# Patient Record
Sex: MALE | Race: WHITE
[De-identification: names, ages, dates, MRNs, and addresses within clinical notes are randomized per-mention and may not be internally consistent; named-entity substitution may affect disease eponyms.]

---

## 2019-02-27 ENCOUNTER — HOSPITAL ENCOUNTER (OUTPATIENT)
Dept: HOSPITAL 100 - OT | Age: 54
Discharge: HOME | End: 2019-02-27
Payer: COMMERCIAL

## 2019-02-27 DIAGNOSIS — M20.091: ICD-10-CM

## 2019-02-27 DIAGNOSIS — M21.331: Primary | ICD-10-CM

## 2019-02-27 PROCEDURE — 97760 ORTHOTIC MGMT&TRAING 1ST ENC: CPT

## 2019-02-27 PROCEDURE — 97763 ORTHC/PROSTC MGMT SBSQ ENC: CPT

## 2019-02-27 PROCEDURE — 97530 THERAPEUTIC ACTIVITIES: CPT

## 2019-02-27 PROCEDURE — 97110 THERAPEUTIC EXERCISES: CPT

## 2019-02-27 PROCEDURE — 97166 OT EVAL MOD COMPLEX 45 MIN: CPT

## 2023-10-12 ENCOUNTER — PHARMACY VISIT (OUTPATIENT)
Dept: PHARMACY | Facility: CLINIC | Age: 58
End: 2023-10-12
Payer: COMMERCIAL

## 2023-10-12 PROCEDURE — RXMED WILLOW AMBULATORY MEDICATION CHARGE

## 2023-10-12 RX ORDER — BUPRENORPHINE HYDROCHLORIDE 450 UG/1
FILM, SOLUBLE BUCCAL
Qty: 60 FILM | Refills: 0 | OUTPATIENT
Start: 2023-10-11 | End: 2023-11-10 | Stop reason: SDUPTHER

## 2023-10-23 ENCOUNTER — LAB (OUTPATIENT)
Dept: LAB | Facility: LAB | Age: 58
End: 2023-10-23
Payer: COMMERCIAL

## 2023-10-23 ENCOUNTER — HOSPITAL ENCOUNTER (OUTPATIENT)
Dept: RADIOLOGY | Facility: HOSPITAL | Age: 58
Discharge: HOME | End: 2023-10-23
Payer: COMMERCIAL

## 2023-10-23 DIAGNOSIS — Z12.2 ENCOUNTER FOR SCREENING FOR MALIGNANT NEOPLASM OF RESPIRATORY ORGANS: ICD-10-CM

## 2023-10-23 DIAGNOSIS — Z12.5 ENCOUNTER FOR SCREENING FOR MALIGNANT NEOPLASM OF PROSTATE: ICD-10-CM

## 2023-10-23 DIAGNOSIS — Z72.0 TOBACCO USE: ICD-10-CM

## 2023-10-23 DIAGNOSIS — E04.2 NONTOXIC MULTINODULAR GOITER: ICD-10-CM

## 2023-10-23 DIAGNOSIS — Z00.00 ENCOUNTER FOR GENERAL ADULT MEDICAL EXAMINATION WITHOUT ABNORMAL FINDINGS: ICD-10-CM

## 2023-10-23 DIAGNOSIS — Z00.00 ENCOUNTER FOR GENERAL ADULT MEDICAL EXAMINATION WITHOUT ABNORMAL FINDINGS: Primary | ICD-10-CM

## 2023-10-23 DIAGNOSIS — R73.01 IMPAIRED FASTING GLUCOSE: ICD-10-CM

## 2023-10-23 LAB
ALBUMIN SERPL BCP-MCNC: 4.4 G/DL (ref 3.4–5)
ALP SERPL-CCNC: 67 U/L (ref 33–120)
ALT SERPL W P-5'-P-CCNC: 12 U/L (ref 10–52)
ANION GAP SERPL CALC-SCNC: 11 MMOL/L (ref 10–20)
AST SERPL W P-5'-P-CCNC: 12 U/L (ref 9–39)
BASOPHILS # BLD AUTO: 0.07 X10*3/UL (ref 0–0.1)
BASOPHILS NFR BLD AUTO: 1 %
BILIRUB SERPL-MCNC: 0.6 MG/DL (ref 0–1.2)
BUN SERPL-MCNC: 14 MG/DL (ref 6–23)
CALCIUM SERPL-MCNC: 8.9 MG/DL (ref 8.6–10.3)
CHLORIDE SERPL-SCNC: 106 MMOL/L (ref 98–107)
CHOLEST SERPL-MCNC: 150 MG/DL (ref 0–199)
CHOLESTEROL/HDL RATIO: 4.4
CO2 SERPL-SCNC: 27 MMOL/L (ref 21–32)
CREAT SERPL-MCNC: 0.88 MG/DL (ref 0.5–1.3)
EOSINOPHIL # BLD AUTO: 0.23 X10*3/UL (ref 0–0.7)
EOSINOPHIL NFR BLD AUTO: 3.4 %
ERYTHROCYTE [DISTWIDTH] IN BLOOD BY AUTOMATED COUNT: 12.4 % (ref 11.5–14.5)
EST. AVERAGE GLUCOSE BLD GHB EST-MCNC: 108 MG/DL
GFR SERPL CREATININE-BSD FRML MDRD: >90 ML/MIN/1.73M*2
GLUCOSE SERPL-MCNC: 104 MG/DL (ref 74–99)
HBA1C MFR BLD: 5.4 %
HCT VFR BLD AUTO: 46.5 % (ref 41–52)
HDLC SERPL-MCNC: 34 MG/DL
HGB BLD-MCNC: 15.8 G/DL (ref 13.5–17.5)
IMM GRANULOCYTES # BLD AUTO: 0.01 X10*3/UL (ref 0–0.7)
IMM GRANULOCYTES NFR BLD AUTO: 0.1 % (ref 0–0.9)
LDLC SERPL CALC-MCNC: 82 MG/DL
LYMPHOCYTES # BLD AUTO: 1.75 X10*3/UL (ref 1.2–4.8)
LYMPHOCYTES NFR BLD AUTO: 25.8 %
MCH RBC QN AUTO: 33 PG (ref 26–34)
MCHC RBC AUTO-ENTMCNC: 34 G/DL (ref 32–36)
MCV RBC AUTO: 97 FL (ref 80–100)
MONOCYTES # BLD AUTO: 0.66 X10*3/UL (ref 0.1–1)
MONOCYTES NFR BLD AUTO: 9.7 %
NEUTROPHILS # BLD AUTO: 4.07 X10*3/UL (ref 1.2–7.7)
NEUTROPHILS NFR BLD AUTO: 60 %
NON HDL CHOLESTEROL: 116 MG/DL (ref 0–149)
NRBC BLD-RTO: 0 /100 WBCS (ref 0–0)
PLATELET # BLD AUTO: 227 X10*3/UL (ref 150–450)
PMV BLD AUTO: 10.2 FL (ref 7.5–11.5)
POTASSIUM SERPL-SCNC: 4.3 MMOL/L (ref 3.5–5.3)
PROT SERPL-MCNC: 6.2 G/DL (ref 6.4–8.2)
PSA SERPL-MCNC: 0.2 NG/ML
RBC # BLD AUTO: 4.79 X10*6/UL (ref 4.5–5.9)
SODIUM SERPL-SCNC: 140 MMOL/L (ref 136–145)
TRIGL SERPL-MCNC: 170 MG/DL (ref 0–149)
TSH SERPL-ACNC: 1.27 MIU/L (ref 0.44–3.98)
VLDL: 34 MG/DL (ref 0–40)
WBC # BLD AUTO: 6.8 X10*3/UL (ref 4.4–11.3)

## 2023-10-23 PROCEDURE — 84443 ASSAY THYROID STIM HORMONE: CPT

## 2023-10-23 PROCEDURE — 71271 CT THORAX LUNG CANCER SCR C-: CPT

## 2023-10-23 PROCEDURE — 83036 HEMOGLOBIN GLYCOSYLATED A1C: CPT

## 2023-10-23 PROCEDURE — 85025 COMPLETE CBC W/AUTO DIFF WBC: CPT

## 2023-10-23 PROCEDURE — 36415 COLL VENOUS BLD VENIPUNCTURE: CPT

## 2023-10-23 PROCEDURE — 80053 COMPREHEN METABOLIC PANEL: CPT

## 2023-10-23 PROCEDURE — 71250 CT THORAX DX C-: CPT | Performed by: RADIOLOGY

## 2023-10-23 PROCEDURE — 84153 ASSAY OF PSA TOTAL: CPT

## 2023-10-23 PROCEDURE — 80061 LIPID PANEL: CPT

## 2023-11-10 ENCOUNTER — PHARMACY VISIT (OUTPATIENT)
Dept: PHARMACY | Facility: CLINIC | Age: 58
End: 2023-11-10
Payer: COMMERCIAL

## 2023-11-10 PROCEDURE — RXMED WILLOW AMBULATORY MEDICATION CHARGE

## 2023-11-10 RX ORDER — BUPRENORPHINE HYDROCHLORIDE 450 UG/1
FILM, SOLUBLE BUCCAL
Qty: 60 FILM | Refills: 0 | OUTPATIENT
Start: 2023-11-10 | End: 2023-12-11 | Stop reason: SDUPTHER

## 2023-12-11 PROCEDURE — RXMED WILLOW AMBULATORY MEDICATION CHARGE

## 2023-12-12 ENCOUNTER — PHARMACY VISIT (OUTPATIENT)
Dept: PHARMACY | Facility: CLINIC | Age: 58
End: 2023-12-12
Payer: COMMERCIAL

## 2024-01-15 PROCEDURE — RXMED WILLOW AMBULATORY MEDICATION CHARGE

## 2024-01-18 ENCOUNTER — PHARMACY VISIT (OUTPATIENT)
Dept: PHARMACY | Facility: CLINIC | Age: 59
End: 2024-01-18
Payer: COMMERCIAL

## 2024-01-23 ENCOUNTER — HOSPITAL ENCOUNTER (OUTPATIENT)
Dept: HOSPITAL 100 - BFHLAB | Age: 59
Discharge: HOME | End: 2024-01-23
Payer: COMMERCIAL

## 2024-01-23 DIAGNOSIS — N49.2: Primary | ICD-10-CM

## 2024-01-23 PROCEDURE — RXMED WILLOW AMBULATORY MEDICATION CHARGE

## 2024-01-23 PROCEDURE — 87070 CULTURE OTHR SPECIMN AEROBIC: CPT

## 2024-01-23 PROCEDURE — 87077 CULTURE AEROBIC IDENTIFY: CPT

## 2024-01-23 PROCEDURE — 87186 SC STD MICRODIL/AGAR DIL: CPT

## 2024-01-23 PROCEDURE — 87205 SMEAR GRAM STAIN: CPT

## 2024-01-23 PROCEDURE — 87075 CULTR BACTERIA EXCEPT BLOOD: CPT

## 2024-01-24 ENCOUNTER — PHARMACY VISIT (OUTPATIENT)
Dept: PHARMACY | Facility: CLINIC | Age: 59
End: 2024-01-24
Payer: COMMERCIAL

## 2024-01-26 ENCOUNTER — PHARMACY VISIT (OUTPATIENT)
Dept: PHARMACY | Facility: CLINIC | Age: 59
End: 2024-01-26
Payer: COMMERCIAL

## 2024-01-26 PROCEDURE — RXMED WILLOW AMBULATORY MEDICATION CHARGE

## 2024-01-26 RX ORDER — PENICILLIN V POTASSIUM 500 MG/1
TABLET, FILM COATED ORAL
Qty: 30 TABLET | Refills: 0 | OUTPATIENT
Start: 2024-01-26

## 2024-01-29 ENCOUNTER — PHARMACY VISIT (OUTPATIENT)
Dept: PHARMACY | Facility: CLINIC | Age: 59
End: 2024-01-29
Payer: COMMERCIAL

## 2024-01-29 PROCEDURE — RXMED WILLOW AMBULATORY MEDICATION CHARGE

## 2024-01-29 RX ORDER — CEFDINIR 300 MG/1
CAPSULE ORAL
Qty: 14 CAPSULE | Refills: 0 | OUTPATIENT
Start: 2024-01-29

## 2024-02-16 PROCEDURE — RXMED WILLOW AMBULATORY MEDICATION CHARGE

## 2024-02-17 ENCOUNTER — PHARMACY VISIT (OUTPATIENT)
Dept: PHARMACY | Facility: CLINIC | Age: 59
End: 2024-02-17
Payer: COMMERCIAL

## 2024-03-18 PROCEDURE — RXMED WILLOW AMBULATORY MEDICATION CHARGE

## 2024-03-20 ENCOUNTER — PHARMACY VISIT (OUTPATIENT)
Dept: PHARMACY | Facility: CLINIC | Age: 59
End: 2024-03-20
Payer: COMMERCIAL

## 2024-04-19 PROCEDURE — RXMED WILLOW AMBULATORY MEDICATION CHARGE

## 2024-04-22 ENCOUNTER — PHARMACY VISIT (OUTPATIENT)
Dept: PHARMACY | Facility: CLINIC | Age: 59
End: 2024-04-22
Payer: COMMERCIAL

## 2024-05-08 PROCEDURE — RXMED WILLOW AMBULATORY MEDICATION CHARGE

## 2024-05-09 ENCOUNTER — PHARMACY VISIT (OUTPATIENT)
Dept: PHARMACY | Facility: CLINIC | Age: 59
End: 2024-05-09
Payer: COMMERCIAL

## 2024-05-21 PROCEDURE — RXMED WILLOW AMBULATORY MEDICATION CHARGE

## 2024-05-22 ENCOUNTER — PHARMACY VISIT (OUTPATIENT)
Dept: PHARMACY | Facility: CLINIC | Age: 59
End: 2024-05-22
Payer: COMMERCIAL

## 2024-06-24 ENCOUNTER — HOSPITAL ENCOUNTER (OUTPATIENT)
Dept: RADIOLOGY | Facility: HOSPITAL | Age: 59
Discharge: HOME | End: 2024-06-24
Payer: COMMERCIAL

## 2024-06-24 DIAGNOSIS — R14.1 GAS PAIN: ICD-10-CM

## 2024-06-24 PROCEDURE — 74018 RADEX ABDOMEN 1 VIEW: CPT

## 2024-06-24 PROCEDURE — 74018 RADEX ABDOMEN 1 VIEW: CPT | Performed by: RADIOLOGY

## 2024-12-23 ENCOUNTER — HOSPITAL ENCOUNTER (OUTPATIENT)
Age: 59
Discharge: HOME | End: 2024-12-23
Payer: COMMERCIAL

## 2024-12-23 DIAGNOSIS — Z00.00: Primary | ICD-10-CM

## 2024-12-23 DIAGNOSIS — Z12.5: ICD-10-CM

## 2024-12-23 DIAGNOSIS — Z79.899: ICD-10-CM

## 2024-12-23 DIAGNOSIS — R73.01: ICD-10-CM

## 2024-12-23 LAB
ALANINE AMINOTRANSFER ALT/SGPT: 19 U/L (ref 16–61)
ALBUMIN SERPL-MCNC: 3.7 G/DL (ref 3.2–5)
ALKALINE PHOSPHATASE: 75 U/L (ref 45–117)
AMPHET UR-MCNC: NEGATIVE NG/ML
ANION GAP: 3 (ref 5–15)
AST(SGOT): 11 U/L (ref 15–37)
BARBITURATE URINE VISTA: NEGATIVE
BENZODIAZEPINE URINE VISTA: NEGATIVE
BUN SERPL-MCNC: 14 MG/DL (ref 7–18)
BUN/CREAT RATIO: 13.9 RATIO (ref 10–20)
CALCIUM SERPL-MCNC: 8.8 MG/DL (ref 8.5–10.1)
CARBON DIOXIDE: 29 MMOL/L (ref 21–32)
CHLORIDE: 106 MMOL/L (ref 98–107)
CHOLEST SERPL-MCNC: 149 MG/DL
COCAINE URINE VISTA: NEGATIVE
DEPRECATED RDW RBC: 46.5 FL (ref 35.1–43.9)
DRUG CONFIRMATION TO FOLLOW?: (no result)
ECSTACY URINE VISTA: NEGATIVE
ERYTHROCYTE [DISTWIDTH] IN BLOOD: 12.9 % (ref 11.6–14.6)
EST GLOM FILT RATE - AFR AMER: 98 ML/MIN (ref 60–?)
GLOBULIN: 3 G/DL (ref 2.2–4.2)
GLUCOSE: 93 MG/DL (ref 74–106)
HCT VFR BLD AUTO: 46.2 % (ref 40–54)
HEMOGLOBIN: 15.6 G/DL (ref 13–16.5)
HGB BLD-MCNC: 15.6 G/DL (ref 13–16.5)
IMMATURE GRANULOCYTES COUNT: 0.03 X10^3/UL (ref 0–0)
MCV RBC: 97.5 FL (ref 80–94)
MEAN CORP HGB CONC: 33.8 G/DL (ref 32–36)
MEAN PLATELET VOL.: 10.3 FL (ref 6.2–12)
METHADONE URINE VISTA: NEGATIVE
NRBC FLAGGED BY ANALYZER: 0 % (ref 0–5)
PCP UR QL: NEGATIVE
PH UR: 5 [PH]
PLATELET # BLD: 228 K/MM3 (ref 150–450)
PLATELET COUNT: 228 K/MM3 (ref 150–450)
POTASSIUM: 4.4 MMOL/L (ref 3.5–5.1)
PSA,TOTAL - ANNUAL SCREEN: 0.2 NG/ML (ref 0–4)
RBC # BLD AUTO: 4.74 M/MM3 (ref 4.6–6.2)
RBC DISTRIBUTION WIDTH CV: 12.9 % (ref 11.6–14.6)
RBC DISTRIBUTION WIDTH SD: 46.5 FL (ref 35.1–43.9)
SP GR UR: 1.01 (ref 1–1.03)
THC URINE VISTA: NEGATIVE
TRIGLYCERIDES: 91 MG/DL
URINE PRESERVATIVE: (no result)
VLDLC SERPL-MCNC: 18 MG/DL (ref 5–40)
WBC # BLD AUTO: 7 K/MM3 (ref 4.4–11)
WHITE BLOOD COUNT: 7 K/MM3 (ref 4.4–11)

## 2024-12-23 PROCEDURE — 81002 URINALYSIS NONAUTO W/O SCOPE: CPT

## 2024-12-23 PROCEDURE — 85025 COMPLETE CBC W/AUTO DIFF WBC: CPT

## 2024-12-23 PROCEDURE — 80307 DRUG TEST PRSMV CHEM ANLYZR: CPT

## 2024-12-23 PROCEDURE — 83036 HEMOGLOBIN GLYCOSYLATED A1C: CPT

## 2024-12-23 PROCEDURE — 80061 LIPID PANEL: CPT

## 2024-12-23 PROCEDURE — 80053 COMPREHEN METABOLIC PANEL: CPT

## 2024-12-23 PROCEDURE — 84153 ASSAY OF PSA TOTAL: CPT

## 2024-12-23 PROCEDURE — G0103 PSA SCREENING: HCPCS

## 2024-12-23 PROCEDURE — 36415 COLL VENOUS BLD VENIPUNCTURE: CPT

## 2025-01-20 ENCOUNTER — HOSPITAL ENCOUNTER (OUTPATIENT)
Dept: RADIOLOGY | Facility: HOSPITAL | Age: 60
Discharge: HOME | End: 2025-01-20
Payer: COMMERCIAL

## 2025-01-20 DIAGNOSIS — Z72.0 TOBACCO USE: ICD-10-CM

## 2025-01-20 DIAGNOSIS — Z12.2 ENCOUNTER FOR SCREENING FOR MALIGNANT NEOPLASM OF RESPIRATORY ORGANS: ICD-10-CM

## 2025-01-20 PROCEDURE — 71271 CT THORAX LUNG CANCER SCR C-: CPT | Performed by: RADIOLOGY

## 2025-01-20 PROCEDURE — 71271 CT THORAX LUNG CANCER SCR C-: CPT

## 2025-03-22 ENCOUNTER — PHARMACY VISIT (OUTPATIENT)
Dept: PHARMACY | Facility: CLINIC | Age: 60
End: 2025-03-22
Payer: COMMERCIAL

## 2025-03-22 PROCEDURE — RXMED WILLOW AMBULATORY MEDICATION CHARGE

## 2025-04-17 ENCOUNTER — PHARMACY VISIT (OUTPATIENT)
Dept: PHARMACY | Facility: CLINIC | Age: 60
End: 2025-04-17
Payer: COMMERCIAL

## 2025-04-17 PROCEDURE — RXMED WILLOW AMBULATORY MEDICATION CHARGE

## 2025-04-17 RX ORDER — BUPRENORPHINE HYDROCHLORIDE 450 UG/1
450 FILM, SOLUBLE BUCCAL EVERY 12 HOURS
Qty: 60 FILM | Refills: 0 | OUTPATIENT
Start: 2025-04-17

## 2025-05-17 PROCEDURE — RXMED WILLOW AMBULATORY MEDICATION CHARGE

## 2025-05-18 ENCOUNTER — PHARMACY VISIT (OUTPATIENT)
Dept: PHARMACY | Facility: CLINIC | Age: 60
End: 2025-05-18
Payer: COMMERCIAL

## 2025-06-13 ENCOUNTER — PHARMACY VISIT (OUTPATIENT)
Dept: PHARMACY | Facility: CLINIC | Age: 60
End: 2025-06-13
Payer: COMMERCIAL

## 2025-06-13 ENCOUNTER — EVALUATION (OUTPATIENT)
Dept: PHYSICAL THERAPY | Facility: CLINIC | Age: 60
End: 2025-06-13
Payer: COMMERCIAL

## 2025-06-13 DIAGNOSIS — M51.362 OTHER INTERVERTEBRAL DISC DEGENERATION, LUMBAR REGION WITH DISCOGENIC BACK PAIN AND LOWER EXTREMITY PAIN: ICD-10-CM

## 2025-06-13 DIAGNOSIS — M43.16 SPONDYLOLISTHESIS, LUMBAR REGION: ICD-10-CM

## 2025-06-13 PROCEDURE — RXMED WILLOW AMBULATORY MEDICATION CHARGE

## 2025-06-13 PROCEDURE — 97161 PT EVAL LOW COMPLEX 20 MIN: CPT | Mod: GP

## 2025-06-13 RX ORDER — APIXABAN 5 MG/1
5 TABLET, FILM COATED ORAL 2 TIMES DAILY
Qty: 180 TABLET | Refills: 1 | OUTPATIENT
Start: 2025-06-13

## 2025-06-13 RX ORDER — BUPRENORPHINE HYDROCHLORIDE 450 UG/1
450 FILM, SOLUBLE BUCCAL EVERY 12 HOURS
Qty: 60 FILM | Refills: 0 | OUTPATIENT
Start: 2025-06-13

## 2025-06-13 RX ORDER — SIMVASTATIN 40 MG/1
40 TABLET, FILM COATED ORAL DAILY
Qty: 90 TABLET | Refills: 3 | OUTPATIENT
Start: 2025-06-13

## 2025-06-13 RX ORDER — PREGABALIN 100 MG/1
100 CAPSULE ORAL 3 TIMES DAILY
Qty: 270 CAPSULE | Refills: 1 | OUTPATIENT
Start: 2025-06-13

## 2025-06-13 RX ORDER — MONTELUKAST SODIUM 10 MG/1
10 TABLET ORAL DAILY
Qty: 90 TABLET | Refills: 3 | OUTPATIENT
Start: 2025-06-13

## 2025-06-13 RX ORDER — NYSTATIN 100000 [USP'U]/G
1 POWDER TOPICAL 2 TIMES DAILY
Qty: 60 G | Refills: 0 | OUTPATIENT
Start: 2025-06-13

## 2025-06-13 RX ORDER — MELOXICAM 15 MG/1
15 TABLET ORAL DAILY
Qty: 90 TABLET | Refills: 3 | OUTPATIENT
Start: 2025-06-13

## 2025-06-13 RX ORDER — OMEPRAZOLE 40 MG/1
40 CAPSULE, DELAYED RELEASE ORAL DAILY
Qty: 90 CAPSULE | Refills: 3 | OUTPATIENT
Start: 2025-06-13

## 2025-06-13 RX ORDER — BACLOFEN 10 MG/1
TABLET ORAL
Qty: 270 TABLET | Refills: 3 | OUTPATIENT
Start: 2025-06-13

## 2025-06-13 RX ORDER — METOPROLOL SUCCINATE 50 MG/1
50 TABLET, EXTENDED RELEASE ORAL DAILY
Qty: 90 TABLET | Refills: 3 | OUTPATIENT
Start: 2025-06-13

## 2025-06-13 RX ORDER — CITALOPRAM 40 MG/1
40 TABLET ORAL DAILY
Qty: 90 TABLET | Refills: 1 | OUTPATIENT
Start: 2025-06-13

## 2025-06-13 ASSESSMENT — PATIENT HEALTH QUESTIONNAIRE - PHQ9
SUM OF ALL RESPONSES TO PHQ9 QUESTIONS 1 AND 2: 0
2. FEELING DOWN, DEPRESSED OR HOPELESS: NOT AT ALL
1. LITTLE INTEREST OR PLEASURE IN DOING THINGS: NOT AT ALL

## 2025-06-13 ASSESSMENT — PAIN SCALES - GENERAL: PAINLEVEL_OUTOF10: 10 - WORST POSSIBLE PAIN

## 2025-06-13 ASSESSMENT — ENCOUNTER SYMPTOMS
OCCASIONAL FEELINGS OF UNSTEADINESS: 1
LOSS OF SENSATION IN FEET: 1
DEPRESSION: 0

## 2025-06-13 ASSESSMENT — PAIN - FUNCTIONAL ASSESSMENT: PAIN_FUNCTIONAL_ASSESSMENT: 0-10

## 2025-06-13 ASSESSMENT — ACTIVITIES OF DAILY LIVING (ADL): EFFECT OF PAIN ON DAILY ACTIVITIES: SEE GOALS

## 2025-06-13 NOTE — PROGRESS NOTES
Physical Therapy Evaluation and Treatment      Patient Name: Abhinav Little  MRN: [id number]  Today's Date: 2025  : 1965  Edith Sebastian  Time Calculation  Start Time: 1510  Stop Time: 1545  Time Calculation (min): 35 min    PT Evaluation Time Entry  PT Evaluation (Low) Time Entry: 35                             Assessment:  Rehab Prognosis: Fair (chronic sx; previous rehab)  Barriers to Participation:  (none)    Plan:  Treatment/Interventions: Aquatic therapy, Cryotherapy, Dry needling, Education/ Instruction, Electrical stimulation, Gait training, Hot pack, Manual therapy, Mechanical traction, Self care/ home management, Therapeutic exercises, Other (comment) (IASTM/cupping)  PT Plan: Skilled PT  PT Frequency: 1 time per week  Duration: 6wks  Onset Date: 85  Rehab Potential: Fair (chronic syndrome; previous rehab)  Plan of Care Agreement: Patient    Current Problem:   1. Spondylolisthesis, lumbar region  Referral to Physical Therapy    Follow Up In Physical Therapy      2. Other intervertebral disc degeneration, lumbar region with discogenic back pain and lower extremity pain  Referral to Physical Therapy    Follow Up In Physical Therapy          Subjective    General:  General  Reason for Referral: lumbar discogenic/BLE pain  Referred By: Jaz MARY  Past Medical History Relevant to Rehab: spondylolisthesis  General Comment:   C/C sx*/Max sx level*:10/10 LB nad BLE  GRETA/DOI/Work*?:  decades long sx; insidious  ADL makes worse*?:stdg  ADL makes better?: sitting from stdg  PLOF:Unlimited job/home tasks performed-NO: std/waling   (see goals)  Testing*:confirmative MRI 3yrs ago    Physical Findings*: Limited: AROM ( trunk )                                                Strength ( abdominals )                                            Confirmed spondylolisthesis with BLE transient sx enough to cause the patient to fall (pending surgery)  POC:  Ongoing clinic PT to include: continue with  "flexion bias core work and trunk ROM; STW PRN    Other: (copay*?- no ) (fall risk*?- mod  ) (ins visit limit*?-  60 )     Precautions:  Precautions  STEADI Fall Risk Score (The score of 4 or more indicates an increased risk of falling): 6  Precautions Comment: low fall risk; neck surgery    Pain:  Pain Assessment  Pain Assessment: 0-10  0-10 (Numeric) Pain Score: 10 - Worst possible pain (max sx)  Pain Location: Back  Pain Orientation: Lower  Pain Radiating Towards: BLE transient sharp sx  Pain Onset:  (sx for decades; insidious onset)  Effect of Pain on Daily Activities: see goals  Pain Interventions:  (chirop; injections; PT; ablations)  Response to Interventions:  (ablations helped)    Prior Level of Function:  Prior Function Per Pt/Caregiver Report  Vocational: Full time employment ()    Objective     Outcome Measures:  Other Measures  Oswestry Disablity Index (ROSELYN): 23     Treatments:eval only-time  Confirmed spondylolisthesis with BLE transient sx enough to cause the patient to fall (pending surgery)  POC:  Ongoing clinic PT to include: continue with flexion bias core work and trunk ROM; STW PRN        Goals:  Active       PT Problem       PT Goal 1       Start:  06/13/25    Expected End:  09/11/25       1. Independent HEP to allow for 50% reduction in max ADL C/C sx ( 10/10) 2-3wks  2. Survey score improvement from 46% to 35% (ROSELYN) 3-4wks  3. ROM increase to allow for improved ADL dressing lowers) 3-4wks (from 50% to 75% SSBs) 3-4wks  4. Strength increase to allow for improved ADL stdg (from 4-/5 to 4+/5 abdominals) 3-4wks         PT Goal 2       Start:  06/13/25    Expected End:  09/11/25       1. \"I want my  back  to feel better\".              Lumbar Spine      Lumbar Palpation/Joint Mobility Assessment  Palpation/Joint Mobility Comment: palpable lumbar step-off  Lumbar AROM  Lumbar flexion: (60°): 85%  Lumbar extension (25°): 40%  Lumbar sidebend right (25°): 50%  Lumbar sidebend left " (25°): 50%    Lumbar Myotomes  Lumbar Myotomes WFL: yes

## 2025-06-19 ENCOUNTER — TREATMENT (OUTPATIENT)
Dept: PHYSICAL THERAPY | Facility: CLINIC | Age: 60
End: 2025-06-19
Payer: COMMERCIAL

## 2025-06-19 DIAGNOSIS — M51.362 OTHER INTERVERTEBRAL DISC DEGENERATION, LUMBAR REGION WITH DISCOGENIC BACK PAIN AND LOWER EXTREMITY PAIN: ICD-10-CM

## 2025-06-19 DIAGNOSIS — M43.16 SPONDYLOLISTHESIS, LUMBAR REGION: ICD-10-CM

## 2025-06-19 PROCEDURE — 97113 AQUATIC THERAPY/EXERCISES: CPT | Mod: GP,CQ

## 2025-06-19 ASSESSMENT — PAIN SCALES - GENERAL: PAINLEVEL_OUTOF10: 4

## 2025-06-19 ASSESSMENT — PAIN - FUNCTIONAL ASSESSMENT: PAIN_FUNCTIONAL_ASSESSMENT: 0-10

## 2025-06-19 NOTE — PROGRESS NOTES
Physical Therapy Treatment    Patient Name: Abhinav Little  MRN: [id number]  Today's Date: 6/19/2025  Time Calculation  Start Time: 1045  Stop Time: 1130  Time Calculation (min): 45 min    PT Therapeutic Procedures Time Entry  Aquatic Therapy Time Entry: 43         Subjective  Patient reports compliance with HEP and expressed good understanding. Patient reports that his pain levels are 4/10 currently, states that his pain was much higher in the morning and at night.       Objective: Trace contraction with palpation    Assessment: Patient identified by name and date of birth. Oriented patient to pool. Trace contraction noted with TrA contraction during palpation. Patient demonstrates some trunk sway with UE/LE activities, moderate cuing to keep TrA engaged. Abolished pain with unloading.     PT Assessment  Rehab Prognosis: Fair (chronic sx; previous rehab)    Plan:   OP PT Plan  Treatment/Interventions: Aquatic therapy, Cryotherapy, Dry needling, Education/ Instruction, Electrical stimulation, Gait training, Hot pack, Manual therapy, Mechanical traction, Self care/ home management, Therapeutic exercises, Other (comment) (IASTM/cupping)  PT Plan: Skilled PT  PT Frequency: 1 time per week  Duration: 6wks  Onset Date: 06/13/85  Rehab Potential: Fair (chronic syndrome; previous rehab)  Plan of Care Agreement: Patient    Plan to continue with core strengthening and stabilization to allow for improved ability to stand for prolonged periods.    POC:  Ongoing clinic PT to include: continue with flexion bias core work and trunk ROM; STW PRN        Precautions  Precautions  Precautions Comment: low fall risk; neck surgery  Pain  Pain Assessment: 0-10  0-10 (Numeric) Pain Score: 4  Pain Location: Back  Pain Orientation: Lower  Pain Radiating Towards: No LE Sx currently          Current Problem  1. Spondylolisthesis, lumbar region  Follow Up In Physical Therapy      2. Other intervertebral disc degeneration, lumbar region  "with discogenic back pain and lower extremity pain  Follow Up In Physical Therapy          Reason for Referral: lumbar discogenic/BLE pain  Referred By: Jaz MARY  Past Medical History Relevant to Rehab: spondylolisthesis  General Comment: 2/9      Treatments:   All exercises performed in 65-70% WBing unless noted  TrA contraction review 10 x 5” holds  Side Stepping 3 laps  Hip Flexion x10  Hip Abduction x10  Alt Marching x10  Heel Raises x10  Squats A  Noodle rotation A  UE paddles Open x20  -flex/ext, push/pull, habd/hadd, abd/add  Dumbbell Rolls A  Paddle Board-push/pull, flex/ext  A  100% unloading 1 lrg noodle, 2 UE support at pool ladder  -bicycle 5'  -hip abd 5'  -hang 5'  Gradual exit 3'    OP EDUCATION:  Outpatient Education  Individual(s) Educated: Patient  Patient/Caregiver Demonstrated Understanding: yes  Plan of Care Discussed and Agreed Upon: yes    Goals:  Active       PT Problem       PT Goal 1       Start:  06/13/25    Expected End:  09/11/25       1. Independent HEP to allow for 50% reduction in max ADL C/C sx ( 10/10) 2-3wks  2. Survey score improvement from 46% to 35% (ROSELYN) 3-4wks  3. ROM increase to allow for improved ADL dressing lowers) 3-4wks (from 50% to 75% SSBs) 3-4wks  4. Strength increase to allow for improved ADL stdg (from 4-/5 to 4+/5 abdominals) 3-4wks         PT Goal 2       Start:  06/13/25    Expected End:  09/11/25       1. \"I want my  back  to feel better\".             "

## 2025-06-27 ENCOUNTER — TREATMENT (OUTPATIENT)
Dept: PHYSICAL THERAPY | Facility: CLINIC | Age: 60
End: 2025-06-27
Payer: COMMERCIAL

## 2025-06-27 DIAGNOSIS — M43.16 SPONDYLOLISTHESIS, LUMBAR REGION: ICD-10-CM

## 2025-06-27 DIAGNOSIS — M51.362 OTHER INTERVERTEBRAL DISC DEGENERATION, LUMBAR REGION WITH DISCOGENIC BACK PAIN AND LOWER EXTREMITY PAIN: ICD-10-CM

## 2025-06-27 PROCEDURE — 97113 AQUATIC THERAPY/EXERCISES: CPT | Mod: GP,CQ | Performed by: PHYSICAL THERAPY ASSISTANT

## 2025-06-27 ASSESSMENT — PAIN - FUNCTIONAL ASSESSMENT: PAIN_FUNCTIONAL_ASSESSMENT: 0-10

## 2025-06-27 ASSESSMENT — PAIN SCALES - GENERAL: PAINLEVEL_OUTOF10: 7

## 2025-06-27 NOTE — PROGRESS NOTES
Physical Therapy    Physical Therapy Treatment    Patient Name: Abhinav Little  MRN: [id number]  Today's Date: 6/27/2025  Time Calculation  Start Time: 1000  Stop Time: 1045  Time Calculation (min): 45 min  PT Therapeutic Procedures Time Entry  Aquatic Therapy Time Entry: 20                   Current Problem  Problem List Items Addressed This Visit           ICD-10-CM       Musculoskeletal and Injuries    Spondylolisthesis, lumbar region M43.16       Neuro    Other intervertebral disc degeneration, lumbar region with discogenic back pain and lower extremity pain M51.362        General  Reason for Referral: lumbar discogenic/BLE pain  Referred By: Jaz MARY  Past Medical History Relevant to Rehab: spondylolisthesis  General Comment: 3/9    Subjective  Patient reports no falls and states low back pain of 7/10 today.  Patient states has worked 3 weeks in a row with no time off.  Reports short handed at work. PATIENT HAD A 20 MINUTE LESSON TODAY DUE TO SEVERE STORM/THUNDERSTORMS.    Precautions  Precautions  Precautions Comment: low fall risk; neck surgery    Pain  Pain Assessment: 0-10  0-10 (Numeric) Pain Score: 7  Pain Location: Back  Pain Orientation: Lower  Pain Radiating Towards: No LE sx currently    Objective    All exercises performed in 65-70% WBing unless noted  TrA contraction review 10 x 5” holds  Side Stepping 3 laps  Hip Flexion x10  Hip Abduction x10  Alt Marching x10  Heel Raises x10  Squats  x 10  Noodle rotation x 10  UE paddles Open x20  -flex/ext, push/pull, habd/hadd, abd/add  Dumbbell Rolls  x 30 sec ea. Dir.  marble  Paddle Board-push/pull, flex/ext   sm blue board x 10 ea.  100% unloading 1 lrg noodle, 2 UE support at pool ladder  -bicycle 5'  -hip abd 5'  -hang 5'  Gradual exit 3'      Treatments:    OP Education      Assessment Patient tolerated treatment without increased back pain.  Patient has weak TrA and attempts to keep intact with there-ex.  Patient needing one UE support with LE  "there-ex and B UE support with 100% unloading.  Patient has good form/understanding with there-ex and keeps body in good alignment.   Continue with core stability while performing dyn activity to decrease back pain.  Patient verified by name and .    Plan  Continue with core strength to improve sleeping, standing, lifting.  Treatment/Interventions: Aquatic therapy, Cryotherapy, Dry needling, Education/ Instruction, Electrical stimulation, Gait training, Hot pack, Manual therapy, Mechanical traction, Self care/ home management, Therapeutic exercises, Other (comment) (IASTM/cupping)  PT Plan: Skilled PT  PT Frequency: 1 time per week  Duration: 6wks  Onset Date: 85  Rehab Potential: Fair (chronic syndrome; previous rehab)  Plan of Care Agreement: Patient    Active       PT Problem       PT Goal 1       Start:  25    Expected End:  25       1. Independent HEP to allow for 50% reduction in max ADL C/C sx ( 10/10) 2-3wks  2. Survey score improvement from 46% to 35% (ROSELYN) 3-4wks  3. ROM increase to allow for improved ADL dressing lowers) 3-4wks (from 50% to 75% SSBs) 3-4wks  4. Strength increase to allow for improved ADL stdg (from 4-/5 to 4+/5 abdominals) 3-4wks         PT Goal 2       Start:  25    Expected End:  25       1. \"I want my  back  to feel better\".              "

## 2025-07-03 ENCOUNTER — TREATMENT (OUTPATIENT)
Dept: PHYSICAL THERAPY | Facility: CLINIC | Age: 60
End: 2025-07-03
Payer: COMMERCIAL

## 2025-07-03 DIAGNOSIS — M51.362 OTHER INTERVERTEBRAL DISC DEGENERATION, LUMBAR REGION WITH DISCOGENIC BACK PAIN AND LOWER EXTREMITY PAIN: ICD-10-CM

## 2025-07-03 DIAGNOSIS — M43.16 SPONDYLOLISTHESIS, LUMBAR REGION: ICD-10-CM

## 2025-07-03 PROCEDURE — 97113 AQUATIC THERAPY/EXERCISES: CPT | Mod: GP,CQ

## 2025-07-03 ASSESSMENT — PAIN - FUNCTIONAL ASSESSMENT: PAIN_FUNCTIONAL_ASSESSMENT: 0-10

## 2025-07-03 ASSESSMENT — PAIN SCALES - GENERAL: PAINLEVEL_OUTOF10: 7

## 2025-07-03 NOTE — PROGRESS NOTES
Physical Therapy Treatment    Patient Name: Abhinav Little  MRN: [id number]  Today's Date: 7/3/2025  Time Calculation  Start Time: 1000  Stop Time: 1046  Time Calculation (min): 46 min    PT Therapeutic Procedures Time Entry  Aquatic Therapy Time Entry: 43         Subjective  Patient reports compliance with HEP and expressed good understanding. Patient reports that he is extremely fatigued after pool session. Reports pain levels of 7/10 before taking pain meds      Objective: Fair trunk control with LE kicks    Assessment: Patient identified by name and date of birth. Patient able to increase mary today with fair ability to maintain trunk control. Patient required some cuing for trunk control and posture with unloading. Patient able to perform trunk rotation in moderate ROM. Decreased pain at end of session.     PT Assessment  Rehab Prognosis: Fair (chronic sx; previous rehab)    Plan: Plan to continue with core strengthening to allow for improved tolerance to prolonged walking without increased pain.     OP PT Plan  Treatment/Interventions: Aquatic therapy, Cryotherapy, Dry needling, Education/ Instruction, Electrical stimulation, Gait training, Hot pack, Manual therapy, Mechanical traction, Self care/ home management, Therapeutic exercises, Other (comment) (IASTM/cupping)  PT Plan: Skilled PT  PT Frequency: 1 time per week  Duration: 6wks  Onset Date: 06/13/85  Rehab Potential: Fair (chronic syndrome; previous rehab)  Plan of Care Agreement: Patient          Precautions  Precautions  Precautions Comment: low fall risk; neck surgery  Pain  Pain Assessment: 0-10  0-10 (Numeric) Pain Score: 7  Pain Location: Back  Pain Orientation: Lower  Pain Radiating Towards: No LE Sx currently, pain upon waking          Current Problem  1. Spondylolisthesis, lumbar region  Follow Up In Physical Therapy      2. Other intervertebral disc degeneration, lumbar region with discogenic back pain and lower extremity pain  Follow  "Up In Physical Therapy          Reason for Referral: lumbar discogenic/BLE pain  Referred By: Jaz MARY  Past Medical History Relevant to Rehab: spondylolisthesis  General Comment: 4/9      Treatments:    All exercises performed in 65-70% WBing unless noted  TrA contraction review 10 x 5” holds  Side Stepping 3 laps  Hip Flexion x20  Hip Abduction x20  Alt Marching x20  Heel Raises x20  Squats  x 15  Noodle rotation x 10  UE paddles Lv 2 x20  -flex/ext, push/pull, habd/hadd, abd/add  Dumbbell Rolls  x 30 sec ea. Dir.  marble  Paddle Board-push/pull, flex/ext   sm blue board x 10 ea.  100% unloading 1 lrg noodle, 2 UE support at pool ladder  -bicycle 5'  -hip abd 5'  -hang 5'  Gradual exit 3'    OP EDUCATION:  Outpatient Education  Individual(s) Educated: Patient  Patient/Caregiver Demonstrated Understanding: yes  Plan of Care Discussed and Agreed Upon: yes    Goals:  Active       PT Problem       PT Goal 1       Start:  06/13/25    Expected End:  09/11/25       1. Independent HEP to allow for 50% reduction in max ADL C/C sx ( 10/10) 2-3wks  2. Survey score improvement from 46% to 35% (ROSELYN) 3-4wks  3. ROM increase to allow for improved ADL dressing lowers) 3-4wks (from 50% to 75% SSBs) 3-4wks  4. Strength increase to allow for improved ADL stdg (from 4-/5 to 4+/5 abdominals) 3-4wks         PT Goal 2       Start:  06/13/25    Expected End:  09/11/25       1. \"I want my  back  to feel better\".             "

## 2025-07-11 ENCOUNTER — TREATMENT (OUTPATIENT)
Dept: PHYSICAL THERAPY | Facility: CLINIC | Age: 60
End: 2025-07-11
Payer: COMMERCIAL

## 2025-07-11 DIAGNOSIS — M43.16 SPONDYLOLISTHESIS, LUMBAR REGION: ICD-10-CM

## 2025-07-11 DIAGNOSIS — M51.362 OTHER INTERVERTEBRAL DISC DEGENERATION, LUMBAR REGION WITH DISCOGENIC BACK PAIN AND LOWER EXTREMITY PAIN: ICD-10-CM

## 2025-07-11 PROCEDURE — 97110 THERAPEUTIC EXERCISES: CPT | Mod: GP

## 2025-07-11 PROCEDURE — 97032 APPL MODALITY 1+ESTIM EA 15: CPT | Mod: GP

## 2025-07-11 ASSESSMENT — PAIN SCALES - GENERAL: PAINLEVEL_OUTOF10: 7

## 2025-07-11 ASSESSMENT — PAIN - FUNCTIONAL ASSESSMENT: PAIN_FUNCTIONAL_ASSESSMENT: 0-10

## 2025-07-11 NOTE — PROGRESS NOTES
Physical Therapy Treatment    Patient Name: Abhinav Little  MRN: [id number]  : 1965  Today's Date: 2025  Edith Sebastian  Time Calculation  Start Time: 1003  Stop Time: 1045  Time Calculation (min): 42 min      PT Therapeutic Procedures Time Entry  Therapeutic Exercise Time Entry: 28   PT Modalities Time Entry  E-Stim (Attended) Time Entry: 13                     General:  General  Reason for Referral: lumbar discogenic/BLE pain  Referred By: Jaz MARY  Past Medical History Relevant to Rehab: spondylolisthesis  General Comment:   Visit #5    Current Problem  Problem List Items Addressed This Visit           ICD-10-CM    Other intervertebral disc degeneration, lumbar region with discogenic back pain and lower extremity pain M51.362    Spondylolisthesis, lumbar region M43.16         Assessment:Patient identity confirmed today with name/. ;  (0  ) falls since last clinic visit; modified/added to clinic/HEP today;  trial needling/stim today-most sx reaction at the low lumbar area today      Plan:  Treatment/Interventions: Aquatic therapy, Cryotherapy, Dry needling, Education/ Instruction, Electrical stimulation, Gait training, Hot pack, Manual therapy, Mechanical traction, Self care/ home management, Therapeutic exercises, Other (comment) (IASTM/cupping)  PT Plan: Skilled PT  PT Frequency: 1 time per week  Duration: 6wks  Onset Date: 85  Rehab Potential: Fair (chronic syndrome; previous rehab)  Plan of Care Agreement: Patient  Continue with treatments consistent with C/C sx centralization;  monitor for new HEP and needlings sx effects    Subjective: I have  7 /10 pain right now.   I felt good while in the pool but unclear how much relief after the pool.  At best there may have minor sx relief immediately after the pool until starting work.      Precautions  Precautions  Precautions Comment: low fall risk; neck surgery      Pain  Pain Assessment: 0-10  0-10 (Numeric) Pain Score: 7  Pain  "Location: Back  Pain Orientation: Left, Lower  Pain Radiating Towards: no leg sx today    Objective    Manual:__0___min    There ex:__28___min  Sci fit 3' Lv1 N  Partial bridge 2x10 N  Hooklying clamshell 2x10 blue N  SKC 10 x 3 count hold N  Pirif stretch 45\" ea N  LTR 10 x ea N  Seated Hadd with TA set 210 ea green N    DN/stim lumbar -60mm, 100mm L/S paraspinals and Qls 13'      All exercises performed in 65-70% WBing unless noted  TrA contraction review 10 x 5” holds  Side Stepping 3 laps  Hip Flexion x20  Hip Abduction x20  Alt Marching x20  Heel Raises x20  Squats  x 15  Noodle rotation x 10  UE paddles Lv 2 x20  -flex/ext, push/pull, habd/hadd, abd/add  Dumbbell Rolls  x 30 sec ea. Dir.  marble  Paddle Board-push/pull, flex/ext   sm blue board x 10 ea.  100% unloading 1 lrg noodle, 2 UE support at pool ladder  -bicycle 5'  -hip abd 5'  -hang 5'  Gradual exit 3'     OP EDUCATION:  Outpatient Education  Individual(s) Educated: Patient  Patient/Caregiver Demonstrated Understanding: yes  Plan of Care Discussed and Agreed Upon: yes       OP EDUCATION:  Access Code: [email or web address]  URL: [email or web address]  Date: 07/11/2025  Prepared by: Gallito Goel    Exercises  - Bridge   - Hooklying Clamshell with Resistance   - Hooklying Single Knee to Chest   - Supine Piriformis Stretch with Foot on Ground   - Lower Trunk Rotations     Goals:  Active       PT Problem       PT Goal 1       Start:  06/13/25    Expected End:  09/11/25       1. Independent HEP to allow for 50% reduction in max ADL C/C sx ( 10/10) 2-3wks  2. Survey score improvement from 46% to 35% (ROSELYN) 3-4wks  3. ROM increase to allow for improved ADL dressing lowers) 3-4wks (from 50% to 75% SSBs) 3-4wks  4. Strength increase to allow for improved ADL stdg (from 4-/5 to 4+/5 abdominals) 3-4wks         PT Goal 2       Start:  06/13/25    Expected End:  09/11/25       1. \"I want my  back  to feel better\".             "

## 2025-07-12 PROCEDURE — RXMED WILLOW AMBULATORY MEDICATION CHARGE

## 2025-07-14 ENCOUNTER — PHARMACY VISIT (OUTPATIENT)
Dept: PHARMACY | Facility: CLINIC | Age: 60
End: 2025-07-14
Payer: COMMERCIAL

## 2025-07-18 ENCOUNTER — APPOINTMENT (OUTPATIENT)
Dept: PHYSICAL THERAPY | Facility: CLINIC | Age: 60
End: 2025-07-18
Payer: COMMERCIAL

## 2025-07-18 DIAGNOSIS — M51.362 OTHER INTERVERTEBRAL DISC DEGENERATION, LUMBAR REGION WITH DISCOGENIC BACK PAIN AND LOWER EXTREMITY PAIN: ICD-10-CM

## 2025-07-18 DIAGNOSIS — M43.16 SPONDYLOLISTHESIS, LUMBAR REGION: ICD-10-CM

## 2025-07-18 NOTE — PROGRESS NOTES
Physical Therapy                 Therapy Communication Note    Patient Name: Abhinav Little  MRN: [id number]  Department:   Room: Room/bed info not found  Today's Date: 7/18/2025     Discipline: Physical Therapy    Missed Visit:       Missed Visit Reason:      Missed Time: Cancel    Comment:

## 2025-07-25 ENCOUNTER — TREATMENT (OUTPATIENT)
Dept: PHYSICAL THERAPY | Facility: CLINIC | Age: 60
End: 2025-07-25
Payer: COMMERCIAL

## 2025-07-25 DIAGNOSIS — M43.16 SPONDYLOLISTHESIS, LUMBAR REGION: ICD-10-CM

## 2025-07-25 DIAGNOSIS — M51.362 OTHER INTERVERTEBRAL DISC DEGENERATION, LUMBAR REGION WITH DISCOGENIC BACK PAIN AND LOWER EXTREMITY PAIN: ICD-10-CM

## 2025-07-25 PROCEDURE — 97110 THERAPEUTIC EXERCISES: CPT | Mod: GP

## 2025-07-25 ASSESSMENT — PAIN - FUNCTIONAL ASSESSMENT: PAIN_FUNCTIONAL_ASSESSMENT: 0-10

## 2025-07-25 ASSESSMENT — PAIN SCALES - GENERAL: PAINLEVEL_OUTOF10: 8

## 2025-07-25 NOTE — PROGRESS NOTES
"Physical Therapy Treatment    Patient Name: Abhinav Little  MRN: [id number]  : 1965  Today's Date: 2025  Edith Sebastian  Time Calculation  Start Time: 1000  Stop Time: 1020  Time Calculation (min): 20 min      PT Therapeutic Procedures Time Entry  Therapeutic Exercise Time Entry: 20                         General:  General  Reason for Referral: lumbar discogenic/BLE pain  Referred By: Jaz MARY  Past Medical History Relevant to Rehab: spondylolisthesis  General Comment:   Visit #6    Current Problem  Problem List Items Addressed This Visit           ICD-10-CM    Other intervertebral disc degeneration, lumbar region with discogenic back pain and lower extremity pain M51.362    Spondylolisthesis, lumbar region M43.16         Assessment:Patient identity confirmed today with name/. ;  ( 0 ) falls since last clinic visit; see goals;  there were no ongoing HEP questions today      Plan:  This patient agrees to discharge to ongoing HEP and home management with (poor  ) progress achieved.       Subjective: I have  8 /10 pain right now.   I felt the needling relaxed some areas of the LLB but the RLB hurt more then.  The pool feels good temporarily but full sx return by the afternoon.      Precautions  Precautions  Precautions Comment: low fall risk; neck surgery      Pain  Pain Assessment: 0-10  0-10 (Numeric) Pain Score: 8  Pain Location: Back  Pain Orientation: Left, Lower  Pain Radiating Towards: no leg sx today    Objective  See goals; mild trunk ROM/strength improvements;  no change of C/C sx with ADL  Manual:__0___min    There ex:___20__min  Goals reviewed, surveyed and/or updated  Reviewed and/or modified ongoing HEP today.  NOT TODAY  Sci fit 3' Lv1 N  Partial bridge 2x10 N  Hooklying clamshell 2x10 blue N  SKC 10 x 3 count hold N  Pirif stretch 45\" ea N  LTR 10 x ea N  Seated Hadd with TA set 210 ea green N     DN/stim lumbar -60mm, 100mm L/S paraspinals and Qls       All exercises performed " "in 65-70% WBing unless noted  TrA contraction review 10 x 5” holds  Side Stepping 3 laps  Hip Flexion x20  Hip Abduction x20  Alt Marching x20  Heel Raises x20  Squats  x 15  Noodle rotation x 10  UE paddles Lv 2 x20  -flex/ext, push/pull, habd/hadd, abd/add  Dumbbell Rolls  x 30 sec ea. Dir.  marble  Paddle Board-push/pull, flex/ext   sm blue board x 10 ea.  100% unloading 1 lrg noodle, 2 UE support at pool ladder  -bicycle 5'  -hip abd 5'  -hang 5'  Gradual exit 3'     OP EDUCATION:  Outpatient Education  Individual(s) Educated: Patient  Patient/Caregiver Demonstrated Understanding: yes  Plan of Care Discussed and Agreed Upon: yes        OP EDUCATION:  Access Code: [email or web address]  URL: [email or web address]  Date: 07/11/2025  Prepared by: Gallito Goel     Exercises  - Bridge   - Hooklying Clamshell with Resistance   - Hooklying Single Knee to Chest   - Supine Piriformis Stretch with Foot on Ground   - Lower Trunk Rotations              Goals:  Active       PT Problem       PT Goal 1       Start:  06/13/25    Expected End:  09/11/25       1. Independent HEP to allow for 50% reduction in max ADL C/C sx ( 10/10) 2-3wks 10/10 max sx within the last 5 days; 7/25/25; NOT MET  2. Survey score improvement from 46% to 35% (ROSELYN) 3-4wks 42% as of 7/25/25; PARTIALLY MET   3. ROM increase to allow for improved ADL dressing lowers) 3-4wks (from 50% to 75% SSBs) 3-4wks no change with noted ADL;  75% SFB today; 7/25/25; PARTIALLY MET   4. Strength increase to allow for improved ADL stdg (from 4-/5 to 4+/5 abdominals) 3-4wks no change with noted ADL stdg; 4/5 abdominals; 7/25/25; PARTIALLY MET          PT Goal 2       Start:  06/13/25    Expected End:  09/11/25       1. \"I want my  back  to feel better\".  \"No change with sx in general\".; 7/25/25; NOT MET            "

## 2025-07-28 PROCEDURE — RXMED WILLOW AMBULATORY MEDICATION CHARGE

## 2025-07-29 ENCOUNTER — PHARMACY VISIT (OUTPATIENT)
Dept: PHARMACY | Facility: CLINIC | Age: 60
End: 2025-07-29
Payer: COMMERCIAL

## 2025-08-11 ENCOUNTER — PHARMACY VISIT (OUTPATIENT)
Dept: PHARMACY | Facility: CLINIC | Age: 60
End: 2025-08-11
Payer: COMMERCIAL

## 2025-08-11 PROCEDURE — RXMED WILLOW AMBULATORY MEDICATION CHARGE

## 2025-08-18 ENCOUNTER — PHARMACY VISIT (OUTPATIENT)
Dept: PHARMACY | Facility: CLINIC | Age: 60
End: 2025-08-18
Payer: COMMERCIAL

## 2025-08-18 PROCEDURE — RXMED WILLOW AMBULATORY MEDICATION CHARGE

## 2025-08-18 RX ORDER — CLOBETASOL PROPIONATE 0.5 MG/G
CREAM TOPICAL
Qty: 60 G | Refills: 1 | OUTPATIENT
Start: 2025-08-18

## 2025-09-05 ENCOUNTER — PHARMACY VISIT (OUTPATIENT)
Dept: PHARMACY | Facility: CLINIC | Age: 60
End: 2025-09-05
Payer: COMMERCIAL

## 2025-09-05 PROCEDURE — RXMED WILLOW AMBULATORY MEDICATION CHARGE

## 2025-09-05 RX ORDER — CITALOPRAM 20 MG/1
20 TABLET ORAL
Qty: 90 TABLET | Refills: 1 | OUTPATIENT
Start: 2025-09-05

## 2025-09-05 RX ORDER — BUPRENORPHINE HYDROCHLORIDE 450 UG/1
FILM, SOLUBLE BUCCAL
Qty: 60 FILM | Refills: 0 | OUTPATIENT
Start: 2025-09-05

## 2025-09-05 RX ORDER — CITALOPRAM 10 MG/1
TABLET ORAL
Qty: 90 TABLET | Refills: 1 | OUTPATIENT
Start: 2025-09-05

## 2025-09-05 RX ORDER — CITALOPRAM 20 MG/1
TABLET ORAL
Qty: 135 TABLET | Refills: 1 | OUTPATIENT
Start: 2025-09-05